# Patient Record
Sex: MALE | Race: ASIAN
[De-identification: names, ages, dates, MRNs, and addresses within clinical notes are randomized per-mention and may not be internally consistent; named-entity substitution may affect disease eponyms.]

---

## 2019-12-13 ENCOUNTER — HOSPITAL ENCOUNTER (EMERGENCY)
Dept: HOSPITAL 74 - JERFT | Age: 29
Discharge: HOME | End: 2019-12-13
Payer: COMMERCIAL

## 2019-12-13 VITALS — TEMPERATURE: 98.2 F | SYSTOLIC BLOOD PRESSURE: 125 MMHG | DIASTOLIC BLOOD PRESSURE: 74 MMHG | HEART RATE: 86 BPM

## 2019-12-13 VITALS — BODY MASS INDEX: 21.4 KG/M2
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
## 2019-12-13 DIAGNOSIS — S83.92XA: Primary | ICD-10-CM
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
## 2019-12-13 PROCEDURE — 3E0233Z INTRODUCTION OF ANTI-INFLAMMATORY INTO MUSCLE, PERCUTANEOUS APPROACH: ICD-10-PCS

## 2019-12-13 NOTE — PDOC
History of Present Illness





- General


Chief Complaint: Pain, Acute


Stated Complaint: LEFT KNEE PAIN


Time Seen by Provider: 12/13/19 08:07





- History of Present Illness


Initial Comments: 





12/13/19 08:10


CHIEF COMPLAINT: left knee pain 





HISTORY OF PRESENT ILLNESS: 28 yo M with no significant PMH presents to fast 

track with pain to left knee x 1 month.  Patient does not remember any fall or 

trauma prior to onset of pain.  Patient reports pain to be worse with flexion 

of knee and relief with extension of L leg.  Patient states the pain has 

worsened over the last month and now can no longer keep his knee bent for 

longer than a few minutes prior to severe pain.  He reports heavy lifting and 

"being on my feet all day" for work as a . 





No recent travel or sick contacts. 





PAST MEDICAL HISTORY: Denies past medical history





FAMILY HISTORY: Denies





SOCIAL HISTORY: Occupation: .  Marijuana use.  Denies tobacco, alcohol, 

other illicit drug use. 





SURGICAL HISTORY: Denies





ALLERGIES: No known drug allergies





REVIEW OF SYSTEMS


General/Constitutional: Denies fever or chills. Denies weakness, weight change.





HEENT: Denies change in vision. Denies ear pain or discharge. Denies sore 

throat.





Cardiovascular: Denies chest pain or shortness of breath.





Respiratory: Denies cough, wheezing, or hemoptysis.





Gastrointestinal: Denies nausea, vomiting, diarrhea or constipation. Denies 

rectal bleeding.





Genitourinary: Denies dysuria, frequency, or change in urination.





Musculoskeletal: Left knee pain x 1 month. 





Skin and breasts: Denies rash or easy bruising.





Neurologic: Denies headache, vertigo, loss of consciousness, or loss of 

sensation.





Psychiatric: Denies depression or anxiety.








PHYSICAL EXAM


General Appearance: Well-appearing, appropriately dressed.  No apparent distress

, no intoxication.





HEENT: EOMI, PERRLA, normal ENT inspection, normal voice, TMs normal, pharynx 

normal.  No conjunctival pallor.  No photophobia, scleral icterus.





Neck: Supple.  Trachea midline. No tenderness, rigidity, carotid bruit, stridor

, lymphadenopathy, or thyromegaly. 





Respiratory/Chest: Lungs CTAB.  No shortness of breath, chest tenderness, 

respiratory distress, accessory muscle use. No crackles, rales, rhonchi, stridor

, wheezing, dullness





Cardiovascular: RRR. S1, S2.  No JVD, murmur, bradycardia, tachycardia.





Vascular Pulses: Dorsalis-Pedis (R): 2+, Dorsalis-Pedis (L): 2+





Gastrointestinal/Abdominal: Normal bowel sounds.  Abdomen soft, non-distended.  

No tenderness or rebound tenderness. No  organomegaly, pulsatile mass, guarding

, hernia, hepatomegaly, splenomegaly.





Lymphatic: No adenopathy, tenderness.





Musculoskeletal/Extremities: +varus stress test, negative valgus stress test.  

Fully ambulatory, full ROM to left knee and leg.   FROM of all extremities, 

normal capillary refill.  Pelvis Stable.  No CVA tenderness. No tenderness to 

extremities, pedal edema, swelling, erythema or deformity.





Integumentary: Appropriate color, dry, warm.  No cyanosis, erythema, jaundice 

or rash





Neurologic: CNs II-XII intact. Fully oriented, alert.  Appropriate mood/affect. 

Motor strength 5/5.  No appreciable EOM palsy, facial droop or sensory deficit.











Past History





- Past Medical History


Allergies/Adverse Reactions: 


 Allergies











Allergy/AdvReac Type Severity Reaction Status Date / Time


 


No Known Allergies Allergy   Verified 12/13/19 08:00











Home Medications: 


Ambulatory Orders





Naproxen 500 mg PO BID #20 tablet. 12/13/19 








CVA: No


COPD: No


CHF: No


DVT: No


Dementia: No


Diabetes: No





- Immunization History


Immunization Up to Date: No





- Psycho Social/Smoking Cessation Hx


Smoking History: Never smoked


Information on smoking cessation initiated: No


Drug/Substance Use Hx: Yes (MARIJUANA)





*Physical Exam





- Vital Signs


 Last Vital Signs











Temp Pulse Resp BP Pulse Ox


 


 98.2 F   86   17   125/74   98 


 


 12/13/19 08:00  12/13/19 08:00  12/13/19 08:00  12/13/19 08:00  12/13/19 08:00














Medical Decision Making





- Medical Decision Making





12/13/19 08:40


28 yo M with no significant PMH presents to fast track with pain to left knee x 

1 month.  





-xray


-toradol





x-ray negative. 





NSAIDS, f/u with ortho. 





Advised patient to take medication as prescribed and follow up with ortho if 

symptoms persist.  Advised patient of signs and symptoms for return to ED.  

Patient verbalized understanding and agrees to plan.





Discharge





- Discharge Information


Problems reviewed: Yes


Clinical Impression/Diagnosis: 


 Strain of ligament





Condition: Stable


Disposition: HOME





- Admission


No





- Additional Discharge Information


Prescriptions: 


Naproxen 500 mg PO BID #20 tablet.dr





- Follow up/Referral


Referrals: 


Chai Rodrigues MD [Staff Physician] - 





- Patient Discharge Instructions


Patient Printed Discharge Instructions:  DI for Ligament Sprains


Additional Instructions: 


Please take medication as prescribed.  As discussed, if your symptoms do not 

improve in 7-10 days, please follow up with an orthopedics for further 

evaluation and a possible MRI or physical therapy.  If you experience any loss 

of sensation to your extremities, any swelling or increased pain to your leg, 

please return to the ER. 





- Post Discharge Activity


Work/Back to School Note:  Back to Work